# Patient Record
Sex: FEMALE | Race: BLACK OR AFRICAN AMERICAN | NOT HISPANIC OR LATINO | Employment: FULL TIME | URBAN - METROPOLITAN AREA
[De-identification: names, ages, dates, MRNs, and addresses within clinical notes are randomized per-mention and may not be internally consistent; named-entity substitution may affect disease eponyms.]

---

## 2017-07-31 ENCOUNTER — APPOINTMENT (OUTPATIENT)
Dept: URGENT CARE | Facility: CLINIC | Age: 27
End: 2017-07-31
Payer: OTHER MISCELLANEOUS

## 2017-07-31 ENCOUNTER — APPOINTMENT (OUTPATIENT)
Dept: RADIOLOGY | Facility: CLINIC | Age: 27
End: 2017-07-31
Payer: OTHER MISCELLANEOUS

## 2017-07-31 ENCOUNTER — TRANSCRIBE ORDERS (OUTPATIENT)
Dept: URGENT CARE | Facility: CLINIC | Age: 27
End: 2017-07-31

## 2017-07-31 DIAGNOSIS — S69.91XA RIGHT WRIST INJURY, INITIAL ENCOUNTER: ICD-10-CM

## 2017-07-31 DIAGNOSIS — S69.91XA RIGHT WRIST INJURY, INITIAL ENCOUNTER: Primary | ICD-10-CM

## 2017-07-31 PROCEDURE — 73110 X-RAY EXAM OF WRIST: CPT

## 2017-07-31 PROCEDURE — 99284 EMERGENCY DEPT VISIT MOD MDM: CPT

## 2017-07-31 PROCEDURE — G0383 LEV 4 HOSP TYPE B ED VISIT: HCPCS

## 2017-08-02 ENCOUNTER — APPOINTMENT (OUTPATIENT)
Dept: URGENT CARE | Facility: CLINIC | Age: 27
End: 2017-08-02
Payer: OTHER MISCELLANEOUS

## 2017-08-02 PROCEDURE — 99213 OFFICE O/P EST LOW 20 MIN: CPT

## 2017-08-21 ENCOUNTER — APPOINTMENT (OUTPATIENT)
Dept: URGENT CARE | Facility: CLINIC | Age: 27
End: 2017-08-21
Payer: OTHER MISCELLANEOUS

## 2017-08-21 PROCEDURE — 99214 OFFICE O/P EST MOD 30 MIN: CPT

## 2017-09-05 ENCOUNTER — APPOINTMENT (OUTPATIENT)
Dept: URGENT CARE | Facility: CLINIC | Age: 27
End: 2017-09-05
Payer: OTHER MISCELLANEOUS

## 2017-09-05 PROCEDURE — 99213 OFFICE O/P EST LOW 20 MIN: CPT

## 2017-09-12 ENCOUNTER — APPOINTMENT (OUTPATIENT)
Dept: URGENT CARE | Facility: CLINIC | Age: 27
End: 2017-09-12
Payer: OTHER MISCELLANEOUS

## 2017-09-12 PROCEDURE — 99213 OFFICE O/P EST LOW 20 MIN: CPT

## 2017-09-21 ENCOUNTER — APPOINTMENT (OUTPATIENT)
Dept: URGENT CARE | Facility: CLINIC | Age: 27
End: 2017-09-21
Payer: OTHER MISCELLANEOUS

## 2017-09-21 PROCEDURE — 99213 OFFICE O/P EST LOW 20 MIN: CPT

## 2017-09-26 ENCOUNTER — APPOINTMENT (OUTPATIENT)
Dept: URGENT CARE | Facility: CLINIC | Age: 27
End: 2017-09-26
Payer: OTHER MISCELLANEOUS

## 2017-09-26 PROCEDURE — 99214 OFFICE O/P EST MOD 30 MIN: CPT

## 2017-10-05 ENCOUNTER — APPOINTMENT (OUTPATIENT)
Dept: PHYSICAL THERAPY | Facility: CLINIC | Age: 27
End: 2017-10-05
Payer: OTHER MISCELLANEOUS

## 2017-10-09 ENCOUNTER — APPOINTMENT (OUTPATIENT)
Dept: PHYSICAL THERAPY | Facility: CLINIC | Age: 27
End: 2017-10-09
Payer: OTHER MISCELLANEOUS

## 2017-10-09 PROCEDURE — G8991 OTHER PT/OT GOAL STATUS: HCPCS

## 2017-10-09 PROCEDURE — G8990 OTHER PT/OT CURRENT STATUS: HCPCS

## 2017-10-09 PROCEDURE — 97162 PT EVAL MOD COMPLEX 30 MIN: CPT

## 2017-10-11 ENCOUNTER — APPOINTMENT (OUTPATIENT)
Dept: PHYSICAL THERAPY | Facility: CLINIC | Age: 27
End: 2017-10-11
Payer: OTHER MISCELLANEOUS

## 2017-10-11 PROCEDURE — 97110 THERAPEUTIC EXERCISES: CPT

## 2017-10-11 PROCEDURE — 97010 HOT OR COLD PACKS THERAPY: CPT

## 2017-10-11 PROCEDURE — 97140 MANUAL THERAPY 1/> REGIONS: CPT

## 2017-10-17 ENCOUNTER — APPOINTMENT (OUTPATIENT)
Dept: PHYSICAL THERAPY | Facility: CLINIC | Age: 27
End: 2017-10-17
Payer: OTHER MISCELLANEOUS

## 2017-10-17 PROCEDURE — 97110 THERAPEUTIC EXERCISES: CPT

## 2017-10-17 PROCEDURE — 97140 MANUAL THERAPY 1/> REGIONS: CPT

## 2017-10-19 ENCOUNTER — APPOINTMENT (OUTPATIENT)
Dept: PHYSICAL THERAPY | Facility: CLINIC | Age: 27
End: 2017-10-19
Payer: OTHER MISCELLANEOUS

## 2017-10-19 PROCEDURE — 97110 THERAPEUTIC EXERCISES: CPT

## 2017-10-19 PROCEDURE — 97112 NEUROMUSCULAR REEDUCATION: CPT

## 2017-10-19 PROCEDURE — 97140 MANUAL THERAPY 1/> REGIONS: CPT

## 2017-10-24 ENCOUNTER — APPOINTMENT (OUTPATIENT)
Dept: PHYSICAL THERAPY | Facility: CLINIC | Age: 27
End: 2017-10-24
Payer: OTHER MISCELLANEOUS

## 2017-10-24 PROCEDURE — 97110 THERAPEUTIC EXERCISES: CPT

## 2017-10-24 PROCEDURE — 97140 MANUAL THERAPY 1/> REGIONS: CPT

## 2017-10-26 ENCOUNTER — APPOINTMENT (OUTPATIENT)
Dept: PHYSICAL THERAPY | Facility: CLINIC | Age: 27
End: 2017-10-26
Payer: OTHER MISCELLANEOUS

## 2017-10-26 PROCEDURE — 97110 THERAPEUTIC EXERCISES: CPT

## 2017-10-26 PROCEDURE — 97140 MANUAL THERAPY 1/> REGIONS: CPT

## 2017-10-27 ENCOUNTER — APPOINTMENT (OUTPATIENT)
Dept: URGENT CARE | Facility: CLINIC | Age: 27
End: 2017-10-27
Payer: OTHER MISCELLANEOUS

## 2017-10-27 PROCEDURE — 99213 OFFICE O/P EST LOW 20 MIN: CPT

## 2017-10-31 ENCOUNTER — APPOINTMENT (OUTPATIENT)
Dept: PHYSICAL THERAPY | Facility: CLINIC | Age: 27
End: 2017-10-31
Payer: OTHER MISCELLANEOUS

## 2017-10-31 PROCEDURE — 97110 THERAPEUTIC EXERCISES: CPT

## 2017-10-31 PROCEDURE — 97140 MANUAL THERAPY 1/> REGIONS: CPT

## 2017-11-02 ENCOUNTER — APPOINTMENT (OUTPATIENT)
Dept: PHYSICAL THERAPY | Facility: CLINIC | Age: 27
End: 2017-11-02
Payer: OTHER MISCELLANEOUS

## 2017-11-02 PROCEDURE — 97140 MANUAL THERAPY 1/> REGIONS: CPT

## 2017-11-02 PROCEDURE — 97110 THERAPEUTIC EXERCISES: CPT

## 2017-11-07 ENCOUNTER — APPOINTMENT (OUTPATIENT)
Dept: PHYSICAL THERAPY | Facility: CLINIC | Age: 27
End: 2017-11-07
Payer: OTHER MISCELLANEOUS

## 2017-11-07 PROCEDURE — 97140 MANUAL THERAPY 1/> REGIONS: CPT

## 2017-11-09 ENCOUNTER — APPOINTMENT (OUTPATIENT)
Dept: PHYSICAL THERAPY | Facility: CLINIC | Age: 27
End: 2017-11-09
Payer: OTHER MISCELLANEOUS

## 2017-11-09 PROCEDURE — G8990 OTHER PT/OT CURRENT STATUS: HCPCS

## 2017-11-09 PROCEDURE — G8991 OTHER PT/OT GOAL STATUS: HCPCS

## 2017-11-09 PROCEDURE — 97140 MANUAL THERAPY 1/> REGIONS: CPT

## 2017-11-09 PROCEDURE — 99213 OFFICE O/P EST LOW 20 MIN: CPT | Performed by: PHYSICAL THERAPIST

## 2017-12-05 ENCOUNTER — APPOINTMENT (OUTPATIENT)
Dept: URGENT CARE | Facility: CLINIC | Age: 27
End: 2017-12-05
Payer: OTHER MISCELLANEOUS

## 2017-12-05 PROCEDURE — 99213 OFFICE O/P EST LOW 20 MIN: CPT

## 2018-02-19 ENCOUNTER — APPOINTMENT (OUTPATIENT)
Dept: URGENT CARE | Facility: CLINIC | Age: 28
End: 2018-02-19
Payer: OTHER MISCELLANEOUS

## 2018-02-19 DIAGNOSIS — M25.511 ACUTE PAIN OF RIGHT SHOULDER: ICD-10-CM

## 2018-02-19 DIAGNOSIS — M54.9 ACUTE MIDLINE BACK PAIN, UNSPECIFIED BACK LOCATION: Primary | ICD-10-CM

## 2018-02-19 PROCEDURE — 72100 X-RAY EXAM L-S SPINE 2/3 VWS: CPT

## 2018-02-19 PROCEDURE — 73030 X-RAY EXAM OF SHOULDER: CPT

## 2018-02-19 PROCEDURE — G0383 LEV 4 HOSP TYPE B ED VISIT: HCPCS

## 2018-02-19 PROCEDURE — 99284 EMERGENCY DEPT VISIT MOD MDM: CPT

## 2018-02-19 PROCEDURE — 72072 X-RAY EXAM THORAC SPINE 3VWS: CPT

## 2018-02-27 ENCOUNTER — APPOINTMENT (OUTPATIENT)
Dept: URGENT CARE | Facility: CLINIC | Age: 28
End: 2018-02-27
Payer: OTHER MISCELLANEOUS

## 2018-02-27 PROCEDURE — 99214 OFFICE O/P EST MOD 30 MIN: CPT | Performed by: FAMILY MEDICINE

## 2018-03-09 ENCOUNTER — APPOINTMENT (OUTPATIENT)
Dept: URGENT CARE | Facility: CLINIC | Age: 28
End: 2018-03-09
Payer: OTHER MISCELLANEOUS

## 2018-03-09 PROCEDURE — 99214 OFFICE O/P EST MOD 30 MIN: CPT | Performed by: FAMILY MEDICINE

## 2018-03-13 ENCOUNTER — TRANSCRIBE ORDERS (OUTPATIENT)
Dept: PHYSICAL THERAPY | Facility: CLINIC | Age: 28
End: 2018-03-13

## 2018-03-13 ENCOUNTER — EVALUATION (OUTPATIENT)
Dept: PHYSICAL THERAPY | Facility: CLINIC | Age: 28
End: 2018-03-13
Payer: OTHER MISCELLANEOUS

## 2018-03-13 DIAGNOSIS — M54.50 BACK PAIN, LUMBOSACRAL: Primary | ICD-10-CM

## 2018-03-13 DIAGNOSIS — S30.0XXD SACRAL CONTUSION, SUBSEQUENT ENCOUNTER: ICD-10-CM

## 2018-03-13 DIAGNOSIS — S46.911D RIGHT SHOULDER STRAIN, SUBSEQUENT ENCOUNTER: ICD-10-CM

## 2018-03-13 PROCEDURE — G8991 OTHER PT/OT GOAL STATUS: HCPCS

## 2018-03-13 PROCEDURE — G8990 OTHER PT/OT CURRENT STATUS: HCPCS

## 2018-03-13 PROCEDURE — 97140 MANUAL THERAPY 1/> REGIONS: CPT

## 2018-03-13 PROCEDURE — 97162 PT EVAL MOD COMPLEX 30 MIN: CPT

## 2018-03-13 NOTE — PROGRESS NOTES
PT Evaluation     Today's date: 3/13/2018  Patient name: Queenie Lindsey  : 1990  MRN: [id number]  Referring provider: Fer Falcon DO  Dx:   Encounter Diagnosis     ICD-10-CM    1  Back pain, lumbosacral M54 5    2  Sacral contusion, subsequent encounter S30  0XXD    3  Right shoulder strain, subsequent encounter S46 911D                   Assessment  Impairments: abnormal gait, abnormal or restricted ROM, impaired physical strength, pain with function and weight-bearing intolerance  Functional limitations: unable to sit comfortably  Assessment details: Pt is a 33year old RHD female who presents to PT after a recent 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 that resulted in localized lumbosacral pain and R shoulder strain  She presents with localized tenderness to BL PSIS and BL SI joints  She has limited trunk lateral flexion with pain provoked in trunk extension and L Sb  She is unable to sit comfortably so LE strength was assessed in supine or sidelying  She has pain limiting weakness in both LE R > L  Her R shoulder has localized tenderness and trigger points along her R upper and middle trapezius and rhomboid musculature  Her R shoulder strength is exceptional with mild irritation when abd and flex strength and PROM were assessed  She should benefit from skilled PT ot help reduce her pain, increase her mobility, improve her tolerance to sitting, increase strength in BL hip and core musculature, and improve overall functioning  Understanding of Dx/Px/POC: good   Prognosis: good    Goals  STG (3-4 weeks)  1: Decrease pain 2-3 grades on VAS  2: Increase hip ROM 10-15 degrees  3:  Increase LE strength 1/2 grade  LTG (4-6 weeks)  1: Improve FOTO 10 pts  2: pt able to complete overhead activity without pain  3: Pt able to sit for 20-40 min without pain  4: No sleep disturbance      Plan  Patient would benefit from: skilled PT  Planned modality interventions: ultrasound and cryotherapy  Planned therapy interventions: abdominal trunk stabilization, manual therapy, joint mobilization, strengthening, stretching, therapeutic exercise, home exercise program and balance  Frequency: 2x week  Duration in visits: 12  Duration in weeks: 6  Treatment plan discussed with: patient  Plan details: Initiate PT as per POC        Subjective Evaluation    History of Present Illness  Date of onset: 2018  Mechanism of injury: trauma  Mechanism of injury: Tried to bring client from shower to chair, she fell back onto Pt and hit her low back on grab bar,quickly reached back with R arm to get client to reach back for grab bar- think over stretched shoulder  Sleeping is related to activity during the day- more sitting with client then more pain later on  Move around a lot during sleep  Standing is better than sitting  Strained muscle in back- 3 years ago  Not a recurrent problem   Quality of life: good    Pain  Current pain ratin (sitting down)  At best pain ratin  At worst pain ratin  Location: R buttocks, sometimes BL buttocks  Quality: sharp and radiating  Relieving factors: medications and change in position  Aggravating factors: sitting  Progression: improved (depends on the day)    Social Support  Steps to enter house: yes  16  Stairs in house: no   Lives in: apartment  Lives with: sister      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, everyday)  Hand dominance: right      Diagnostic Tests  X-ray: normal  Patient Goals  Patient goals for therapy: decreased pain, independence with ADLs/IADLs and return to sport/leisure activities          Objective     Active Range of Motion     Right Shoulder   Normal active range of motion    Lumbar   Flexion: 80 degrees   Extension: 35 degrees   Left lateral flexion: 30 degrees   Right lateral flexion: 40 degrees     Additional Active Range of Motion Details  Tenderness to R UT/MT/rhomboid       Tenderness tolocalzed tendnerness to sacrum and PSIS BL  Pain at end range ext and L SB        Passive Range of Motion     Right Shoulder   Normal passive range of motion  Flexion: 155 degrees   Abduction: 145 degrees   External rotation 90°: 75 degrees   Internal rotation 90°: 65 degrees   Left Hip   Flexion: 105 degrees   External rotation (90/90): 45 degrees   Internal rotation (90/90): 15 degrees     Right Hip   Flexion: 95 degrees   External rotation (90/90): 45 degrees   Internal rotation (90/90): 25 degrees     Additional Passive Range of Motion Details  Pain at end range flexion and abd  No pain with GHJ rotation  No significant capsular tightness observed  -ANYA's  +FADIR BL    Strength/Myotome Testing     Right Shoulder     Planes of Motion   Flexion: 4+   Extension: 4+   Abduction: 4+   External rotation at 0°: 4   Internal rotation at 0°: 4+     Left Hip   Planes of Motion   Flexion: 4+  Extension: 4  Abduction: 4-  Adduction: 4  External rotation: 4-  Internal rotation: 4-    Right Hip   Planes of Motion   Flexion: 4  Extension: 4  Abduction: 4-  Adduction: 4-  External rotation: 4-  Internal rotation: 4-    Additional Strength Details  Mild pain with abd and ER resisted  Strength assessed in supine hookyling position  Hip abd/add  assessed in SL in pain free ranges  Pain with hip abd BL; pain with all motions on R  -SLR, mild pain with hamstring stretch          Flowsheet Rows    Flowsheet Row Most Recent Value   PT/OT G-Codes   Current Score  55   Projected Score  47   FOTO information reviewed  Yes   Assessment Type  Evaluation   G code set  Other PT/OT Primary   Other PT Primary Current Status ()  CK   Other PT Primary Goal Status ()  CJ          Precautions: pain with sitting    Daily Treatment Diary     Manual              Hip ROM             Gentle lateral distraction with strap BL             SL SI joint mobs             isometrics                              Exercise Diary              Knee to chest stretch             Knee fallout             Cane overhead with DLS Hip flex isometrics BL             Hip ext isometrics with strap BL                                                                                                                                                                                                                    Modalities              US in prone to SI joint                                         HEP: hip flex in hooklying for stretch, posterior pelvic tilts, knee fall out, pelvic tilts ant/post in standing, wall slides for R shoulder, TB row, ext

## 2018-03-19 ENCOUNTER — OFFICE VISIT (OUTPATIENT)
Dept: PHYSICAL THERAPY | Facility: CLINIC | Age: 28
End: 2018-03-19
Payer: OTHER MISCELLANEOUS

## 2018-03-19 DIAGNOSIS — S46.911D RIGHT SHOULDER STRAIN, SUBSEQUENT ENCOUNTER: ICD-10-CM

## 2018-03-19 DIAGNOSIS — S30.0XXD SACRAL CONTUSION, SUBSEQUENT ENCOUNTER: Primary | ICD-10-CM

## 2018-03-19 DIAGNOSIS — M54.50 BACK PAIN, LUMBOSACRAL: ICD-10-CM

## 2018-03-19 PROCEDURE — 97140 MANUAL THERAPY 1/> REGIONS: CPT

## 2018-03-19 NOTE — PROGRESS NOTES
Daily Note     Today's date: 3/19/2018  Patient name: Kaila Myers  : 1990  MRN: [id number]  Referring provider: All Farnsworth DO  Dx:   Encounter Diagnosis     ICD-10-CM    1  Sacral contusion, subsequent encounter S30  0XXD    2  Back pain, lumbosacral M54 5    3  Right shoulder strain, subsequent encounter S46 911D                   Subjective: Exercises are really helpful  Worked a double shift on Friday going into Saturday, was really sore after  Today feeling lightheaded from allergies and anemia and one of the clients had [place] of [place] onion soup and she's allergic to onions so it set her off  She wishes to make session a bit shorter today if at all possible  R shoulder hardly bothering her at all  Objective: See treatment diary below  Daily Treatment Diary     Manual  3/19            Hip ROM 10            Gentle lateral distraction with strap BL 10            SL SI joint mobs 10            isometrics              30                Exercise Diary  3/19            Knee to chest stretch NV            Knee fallout NV            Cane overhead with DLS NV            Hip flex isometrics BL NV            Hip ext isometrics with strap BL NV                                                                                                                                                                                                                   Modalities              US in prone to SI joint NP                                        HEP: hip flex in hooklying for stretch, posterior pelvic tilts, knee fall out, pelvic tilts ant/post in standing, wall slides for R shoulder, TB row, ext      Assessment: Tolerated treatment well  Patient would benefit from continued PT  Pt tolerated first session well  Performed only manuals on Pt  Hip IR still limited  Pt reported her low back was feeling good after session  Add TE Nv        Plan: Continue per plan of care

## 2018-03-21 ENCOUNTER — APPOINTMENT (OUTPATIENT)
Dept: PHYSICAL THERAPY | Facility: CLINIC | Age: 28
End: 2018-03-21
Payer: OTHER MISCELLANEOUS

## 2018-03-26 ENCOUNTER — OFFICE VISIT (OUTPATIENT)
Dept: PHYSICAL THERAPY | Facility: CLINIC | Age: 28
End: 2018-03-26
Payer: OTHER MISCELLANEOUS

## 2018-03-26 DIAGNOSIS — S30.0XXD SACRAL CONTUSION, SUBSEQUENT ENCOUNTER: Primary | ICD-10-CM

## 2018-03-26 DIAGNOSIS — S46.911D RIGHT SHOULDER STRAIN, SUBSEQUENT ENCOUNTER: ICD-10-CM

## 2018-03-26 DIAGNOSIS — M54.50 BACK PAIN, LUMBOSACRAL: ICD-10-CM

## 2018-03-26 PROCEDURE — 97110 THERAPEUTIC EXERCISES: CPT

## 2018-03-26 PROCEDURE — 97140 MANUAL THERAPY 1/> REGIONS: CPT

## 2018-03-26 NOTE — PROGRESS NOTES
Daily Note     Today's date: 3/26/2018  Patient name: Sis Vines  : 1990  MRN: [id number]  Referring provider: Jule Lesches, DO  Dx:   Encounter Diagnosis     ICD-10-CM    1  Sacral contusion, subsequent encounter S30  0XXD    2  Back pain, lumbosacral M54 5    3  Right shoulder strain, subsequent encounter S46 911D                   Subjective: Pt reports her pulled tooth bothers her so much she has forgotten about her pain in the shld in [place]  Objective: See treatment diary below      Assessment: Tolerated treatment well  Patient  Could maxwell a progression NV   Plan: Continue per plan of care  Progress treatment as tolerated      Daily Treatment Diary     Manual  3/19 3/26           Hip ROM 10 JK 10           Gentle lateral distraction with strap BL 10 distal  JK  10           SL SI joint mobs 10 np           isometrics             MFR to QL in SL 30 JK 10               Exercise Diary  3/19 3/26           Knee to chest stretch NV 3x20"           Knee fallout NV 10           Cane overhead with DLS NV 08G4"           Hip flex isometrics BL NV 10x10"           Hip ext isometrics with strap BL NV 10x10"                                                                                                                                                                                                                  Modalities   3/26           US in prone to SI joint NP np

## 2018-03-27 ENCOUNTER — APPOINTMENT (OUTPATIENT)
Dept: URGENT CARE | Facility: CLINIC | Age: 28
End: 2018-03-27
Payer: OTHER MISCELLANEOUS

## 2018-03-27 PROCEDURE — 99213 OFFICE O/P EST LOW 20 MIN: CPT | Performed by: FAMILY MEDICINE

## 2018-03-28 ENCOUNTER — OFFICE VISIT (OUTPATIENT)
Dept: PHYSICAL THERAPY | Facility: CLINIC | Age: 28
End: 2018-03-28
Payer: OTHER MISCELLANEOUS

## 2018-03-28 DIAGNOSIS — S46.911D RIGHT SHOULDER STRAIN, SUBSEQUENT ENCOUNTER: ICD-10-CM

## 2018-03-28 DIAGNOSIS — M54.50 BACK PAIN, LUMBOSACRAL: ICD-10-CM

## 2018-03-28 DIAGNOSIS — S30.0XXD SACRAL CONTUSION, SUBSEQUENT ENCOUNTER: Primary | ICD-10-CM

## 2018-03-28 PROCEDURE — G8992 OTHER PT/OT  D/C STATUS: HCPCS

## 2018-03-28 PROCEDURE — 97140 MANUAL THERAPY 1/> REGIONS: CPT

## 2018-03-28 PROCEDURE — 97110 THERAPEUTIC EXERCISES: CPT

## 2018-03-28 PROCEDURE — G8991 OTHER PT/OT GOAL STATUS: HCPCS

## 2018-03-28 NOTE — PROGRESS NOTES
Daily Note     Today's date: 3/28/2018  Patient name: Chance Mena  : 1990  MRN: [id number]  Referring provider: Josefa Cordova DO  Dx:   Encounter Diagnosis     ICD-10-CM    1  Sacral contusion, subsequent encounter S30  0XXD    2  Back pain, lumbosacral M54 5    3  Right shoulder strain, subsequent encounter H78 277L               18: Pt has not returned to therapy since 3/28/18, no follow up, D/C to home program at this time  Subjective: Pt reports she saw the Dr  He discharged her to return to full duty next week  She reports she is feeling okay, she still has mild pain to area in sacrum, she can tolerate longer sitting periods and if she has to sit for a long period of time at work she brings her "emergency" kit that includes her sitting donut  Pt also reports exercises are really helping  She began jogging for a marathon she will be running in Oct, she jogged about 1 5 miles without pain  Objective: See treatment diary below  Daily Treatment Diary     Manual  3/19 3/26 3/28          Hip ROM 10 JK 10 10          Gentle lateral distraction with strap BL 10 distal  JK  10 10          SL SI joint mobs 10 np 5          isometrics             MFR to QL in SL 30 JK 10 25              Exercise Diary  3/19 3/26 3/28          Knee to chest stretch NV 3x20" 3x20"          Knee fallout NV 10 10          Cane overhead with DLS NV 94M0"           Hip flex isometrics BL NV 10x10" 10x10"          Hip ext isometrics with strap BL NV 10x10" 10x10"          Supine clamshell   BTB 15x5"                                                                                                                                                                                      10              Modalities   3/26           US in prone to SI joint NP np                                               Assessment: Tolerated treatment well   Patient exhibited good technique with therapeutic exercises and would benefit from continued PT  Pt tolerated session well, increased hip mobility  Pt should continue with therapy to further her core and hip strength to help stabilize her SI joint  Plan: Continue per plan of care

## 2019-04-01 ENCOUNTER — APPOINTMENT (OUTPATIENT)
Dept: RADIOLOGY | Facility: CLINIC | Age: 29
End: 2019-04-01
Payer: OTHER MISCELLANEOUS

## 2019-04-01 ENCOUNTER — APPOINTMENT (OUTPATIENT)
Dept: URGENT CARE | Facility: CLINIC | Age: 29
End: 2019-04-01
Payer: OTHER MISCELLANEOUS

## 2019-04-01 DIAGNOSIS — M25.511 ACUTE PAIN OF RIGHT SHOULDER: Primary | ICD-10-CM

## 2019-04-01 DIAGNOSIS — M25.511 ACUTE PAIN OF RIGHT SHOULDER: ICD-10-CM

## 2019-04-01 PROCEDURE — G0382 LEV 3 HOSP TYPE B ED VISIT: HCPCS | Performed by: NURSE PRACTITIONER

## 2019-04-01 PROCEDURE — 99283 EMERGENCY DEPT VISIT LOW MDM: CPT | Performed by: NURSE PRACTITIONER

## 2019-04-01 PROCEDURE — 73030 X-RAY EXAM OF SHOULDER: CPT

## 2019-04-05 ENCOUNTER — APPOINTMENT (OUTPATIENT)
Dept: URGENT CARE | Facility: CLINIC | Age: 29
End: 2019-04-05
Payer: OTHER MISCELLANEOUS

## 2019-04-05 PROCEDURE — 99213 OFFICE O/P EST LOW 20 MIN: CPT | Performed by: PHYSICIAN ASSISTANT

## 2019-04-16 ENCOUNTER — APPOINTMENT (OUTPATIENT)
Dept: URGENT CARE | Facility: CLINIC | Age: 29
End: 2019-04-16
Payer: OTHER MISCELLANEOUS

## 2019-04-16 PROCEDURE — 99213 OFFICE O/P EST LOW 20 MIN: CPT | Performed by: PHYSICIAN ASSISTANT

## 2023-12-26 ENCOUNTER — HOSPITAL ENCOUNTER (EMERGENCY)
Facility: HOSPITAL | Age: 33
Discharge: HOME | End: 2023-12-26
Attending: EMERGENCY MEDICINE
Payer: COMMERCIAL

## 2023-12-26 ENCOUNTER — APPOINTMENT (EMERGENCY)
Dept: RADIOLOGY | Facility: HOSPITAL | Age: 33
End: 2023-12-26
Payer: COMMERCIAL

## 2023-12-26 VITALS
TEMPERATURE: 98.4 F | RESPIRATION RATE: 18 BRPM | SYSTOLIC BLOOD PRESSURE: 136 MMHG | DIASTOLIC BLOOD PRESSURE: 72 MMHG | HEART RATE: 98 BPM | OXYGEN SATURATION: 99 %
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
## 2023-12-26 DIAGNOSIS — M25.512 LEFT SHOULDER PAIN: ICD-10-CM

## 2023-12-26 PROCEDURE — 96372 THER/PROPH/DIAG INJ SC/IM: CPT

## 2023-12-26 PROCEDURE — 99284 EMERGENCY DEPT VISIT MOD MDM: CPT

## 2023-12-26 PROCEDURE — 73030 X-RAY EXAM OF SHOULDER: CPT

## 2023-12-26 PROCEDURE — 99284 EMERGENCY DEPT VISIT MOD MDM: CPT | Performed by: EMERGENCY MEDICINE

## 2023-12-26 RX ORDER — KETOROLAC TROMETHAMINE 30 MG/ML
15 INJECTION, SOLUTION INTRAMUSCULAR; INTRAVENOUS ONCE
Status: COMPLETED | OUTPATIENT
Start: 2023-12-26 | End: 2023-12-26

## 2023-12-26 RX ADMIN — KETOROLAC TROMETHAMINE 15 MG: 30 INJECTION, SOLUTION INTRAMUSCULAR; INTRAVENOUS at 17:12
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
## 2023-12-30 LAB
DME PARACHUTE DELIVERY DATE ACTUAL: NORMAL
DME PARACHUTE DELIVERY DATE REQUESTED: NORMAL
DME PARACHUTE ITEM DESCRIPTION: NORMAL
DME PARACHUTE ORDER STATUS: NORMAL
DME PARACHUTE SUPPLIER NAME: NORMAL
DME PARACHUTE SUPPLIER PHONE: NORMAL
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
## 2024-05-08 ENCOUNTER — HOSPITAL ENCOUNTER (EMERGENCY)
Facility: HOSPITAL | Age: 34
Discharge: HOME | End: 2024-05-08
Attending: EMERGENCY MEDICINE
Payer: COMMERCIAL

## 2024-05-08 ENCOUNTER — APPOINTMENT (EMERGENCY)
Dept: RADIOLOGY | Facility: HOSPITAL | Age: 34
End: 2024-05-08
Payer: COMMERCIAL

## 2024-05-08 VITALS
HEART RATE: 96 BPM | RESPIRATION RATE: 19 BRPM | TEMPERATURE: 97 F | SYSTOLIC BLOOD PRESSURE: 124 MMHG | DIASTOLIC BLOOD PRESSURE: 84 MMHG | OXYGEN SATURATION: 98 %

## 2024-05-08 DIAGNOSIS — R10.2 PELVIC PAIN: Primary | ICD-10-CM

## 2024-05-08 DIAGNOSIS — N83.209 RUPTURED OVARIAN CYST: ICD-10-CM

## 2024-05-08 LAB
ALBUMIN SERPL BCP-MCNC: 4.2 G/DL (ref 3.5–5)
ALP SERPL-CCNC: 56 U/L (ref 34–104)
ALT SERPL W P-5'-P-CCNC: 10 U/L (ref 7–52)
ANION GAP SERPL CALCULATED.3IONS-SCNC: 8 MMOL/L (ref 4–13)
AST SERPL W P-5'-P-CCNC: 10 U/L (ref 13–39)
BACTERIA UR QL AUTO: ABNORMAL /HPF
BASOPHILS # BLD AUTO: 0.03 THOUSANDS/ÂΜL (ref 0–0.1)
BASOPHILS NFR BLD AUTO: 0 % (ref 0–1)
BILIRUB SERPL-MCNC: 0.4 MG/DL (ref 0.2–1)
BILIRUB UR QL STRIP: NEGATIVE
BUN SERPL-MCNC: 13 MG/DL (ref 5–25)
CALCIUM SERPL-MCNC: 8.8 MG/DL (ref 8.4–10.2)
CAOX CRY URNS QL MICRO: ABNORMAL /HPF
CHLORIDE SERPL-SCNC: 108 MMOL/L (ref 96–108)
CLARITY UR: ABNORMAL
CO2 SERPL-SCNC: 21 MMOL/L (ref 21–32)
COLOR UR: ABNORMAL
CREAT SERPL-MCNC: 0.73 MG/DL (ref 0.6–1.3)
EOSINOPHIL # BLD AUTO: 0 THOUSAND/ÂΜL (ref 0–0.61)
EOSINOPHIL NFR BLD AUTO: 0 % (ref 0–6)
ERYTHROCYTE [DISTWIDTH] IN BLOOD BY AUTOMATED COUNT: 16.6 % (ref 11.6–15.1)
EXT PREGNANCY TEST URINE: NEGATIVE
EXT. CONTROL: NORMAL
GFR SERPL CREATININE-BSD FRML MDRD: 109 ML/MIN/1.73SQ M
GLUCOSE SERPL-MCNC: 105 MG/DL (ref 65–140)
GLUCOSE UR STRIP-MCNC: NEGATIVE MG/DL
HCT VFR BLD AUTO: 32.7 % (ref 34.8–46.1)
HGB BLD-MCNC: 10.2 G/DL (ref 11.5–15.4)
HGB UR QL STRIP.AUTO: ABNORMAL
IMM GRANULOCYTES # BLD AUTO: 0.04 THOUSAND/UL (ref 0–0.2)
IMM GRANULOCYTES NFR BLD AUTO: 0 % (ref 0–2)
KETONES UR STRIP-MCNC: ABNORMAL MG/DL
LEUKOCYTE ESTERASE UR QL STRIP: NEGATIVE
LIPASE SERPL-CCNC: 35 U/L (ref 11–82)
LYMPHOCYTES # BLD AUTO: 0.87 THOUSANDS/ÂΜL (ref 0.6–4.47)
LYMPHOCYTES NFR BLD AUTO: 9 % (ref 14–44)
MCH RBC QN AUTO: 25.1 PG (ref 26.8–34.3)
MCHC RBC AUTO-ENTMCNC: 31.2 G/DL (ref 31.4–37.4)
MCV RBC AUTO: 81 FL (ref 82–98)
MONOCYTES # BLD AUTO: 0.69 THOUSAND/ÂΜL (ref 0.17–1.22)
MONOCYTES NFR BLD AUTO: 7 % (ref 4–12)
MUCOUS THREADS UR QL AUTO: ABNORMAL
NEUTROPHILS # BLD AUTO: 8.28 THOUSANDS/ÂΜL (ref 1.85–7.62)
NEUTS SEG NFR BLD AUTO: 84 % (ref 43–75)
NITRITE UR QL STRIP: NEGATIVE
NON-SQ EPI CELLS URNS QL MICRO: ABNORMAL /HPF
NRBC BLD AUTO-RTO: 0 /100 WBCS
PH UR STRIP.AUTO: 6 [PH] (ref 4.5–8)
PLATELET # BLD AUTO: 315 THOUSANDS/UL (ref 149–390)
PMV BLD AUTO: 10.3 FL (ref 8.9–12.7)
POTASSIUM SERPL-SCNC: 3.8 MMOL/L (ref 3.5–5.3)
PROT SERPL-MCNC: 7.9 G/DL (ref 6.4–8.4)
PROT UR STRIP-MCNC: ABNORMAL MG/DL
RBC # BLD AUTO: 4.06 MILLION/UL (ref 3.81–5.12)
RBC #/AREA URNS AUTO: ABNORMAL /HPF
SODIUM SERPL-SCNC: 137 MMOL/L (ref 135–147)
SP GR UR STRIP.AUTO: >=1.03 (ref 1–1.03)
UROBILINOGEN UR QL STRIP.AUTO: 0.2 E.U./DL
WBC # BLD AUTO: 9.91 THOUSAND/UL (ref 4.31–10.16)
WBC #/AREA URNS AUTO: ABNORMAL /HPF

## 2024-05-08 PROCEDURE — 36415 COLL VENOUS BLD VENIPUNCTURE: CPT

## 2024-05-08 PROCEDURE — 85025 COMPLETE CBC W/AUTO DIFF WBC: CPT

## 2024-05-08 PROCEDURE — 81025 URINE PREGNANCY TEST: CPT

## 2024-05-08 PROCEDURE — 96374 THER/PROPH/DIAG INJ IV PUSH: CPT

## 2024-05-08 PROCEDURE — 74177 CT ABD & PELVIS W/CONTRAST: CPT

## 2024-05-08 PROCEDURE — 96375 TX/PRO/DX INJ NEW DRUG ADDON: CPT

## 2024-05-08 PROCEDURE — 99284 EMERGENCY DEPT VISIT MOD MDM: CPT

## 2024-05-08 PROCEDURE — 83690 ASSAY OF LIPASE: CPT

## 2024-05-08 PROCEDURE — 99285 EMERGENCY DEPT VISIT HI MDM: CPT | Performed by: EMERGENCY MEDICINE

## 2024-05-08 PROCEDURE — 81001 URINALYSIS AUTO W/SCOPE: CPT

## 2024-05-08 PROCEDURE — 80053 COMPREHEN METABOLIC PANEL: CPT

## 2024-05-08 RX ORDER — KETOROLAC TROMETHAMINE 30 MG/ML
15 INJECTION, SOLUTION INTRAMUSCULAR; INTRAVENOUS ONCE
Status: COMPLETED | OUTPATIENT
Start: 2024-05-08 | End: 2024-05-08

## 2024-05-08 RX ORDER — OXYCODONE HYDROCHLORIDE AND ACETAMINOPHEN 5; 325 MG/1; MG/1
1 TABLET ORAL EVERY 6 HOURS PRN
Qty: 12 TABLET | Refills: 0 | Status: SHIPPED | OUTPATIENT
Start: 2024-05-08

## 2024-05-08 RX ORDER — ONDANSETRON 2 MG/ML
4 INJECTION INTRAMUSCULAR; INTRAVENOUS ONCE
Status: COMPLETED | OUTPATIENT
Start: 2024-05-08 | End: 2024-05-08

## 2024-05-08 RX ADMIN — KETOROLAC TROMETHAMINE 15 MG: 30 INJECTION, SOLUTION INTRAMUSCULAR; INTRAVENOUS at 14:16

## 2024-05-08 RX ADMIN — ONDANSETRON 4 MG: 2 INJECTION INTRAMUSCULAR; INTRAVENOUS at 14:17

## 2024-05-08 RX ADMIN — IOHEXOL 100 ML: 350 INJECTION, SOLUTION INTRAVENOUS at 14:59

## 2024-05-08 RX ADMIN — MORPHINE SULFATE 2 MG: 2 INJECTION, SOLUTION INTRAMUSCULAR; INTRAVENOUS at 15:43

## 2024-05-08 NOTE — DISCHARGE INSTRUCTIONS
Return to ER for any severe pain not controlled with at home medications or for new fever, shortness of breath, or persistent vomiting.  Take Tylenol 500 mg and ibuprofen 400 mg 4 times per day for the next several days.

## 2024-05-08 NOTE — ED PROVIDER NOTES
History  Chief Complaint   Patient presents with    Pelvic Pain     Reports R sided pelvic pain. Has known kidney stones and has L sided uterine cyst. +N/V.  Denies pain with urination.      33-year-old female with history of uterine fibroids presents with acute onset right lower quadrant pain that started approximately 4 hours prior to arrival.  Pain is described as constant, severe, and radiates to the left lower quadrant.  Associated with nausea with 1 episode of nonbloody vomiting.  Denies chest pain, respiratory symptoms, urinary symptoms, vaginal discharge, or any other symptoms.  Currently on her menstrual cycle.    Per chart review patient has a history of recurrent pelvic pain with recent ultrasound imaging showing normal right ovary.        Prior to Admission Medications   Prescriptions Last Dose Informant Patient Reported? Taking?   Loratadine (CLARITIN) 10 MG CAPS Not Taking  Yes No   Sig: Take 1 tablet by mouth daily.   Patient not taking: Reported on 5/8/2024   methocarbamol (ROBAXIN) 500 mg tablet Not Taking  No No   Sig: Take 1 tablet (500 mg total) by mouth 4 (four) times a day as needed for muscle spasms.   Patient not taking: Reported on 5/8/2024      Facility-Administered Medications: None       Past Medical History:   Diagnosis Date    Allergic        History reviewed. No pertinent surgical history.    History reviewed. No pertinent family history.  I have reviewed and agree with the history as documented.    E-Cigarette/Vaping     E-Cigarette/Vaping Substances     Social History     Tobacco Use    Smoking status: Never    Smokeless tobacco: Never   Substance Use Topics    Alcohol use: No    Drug use: No        Review of Systems   Constitutional:  Negative for chills.   HENT:  Negative for ear pain and sore throat.    Eyes:  Negative for pain and visual disturbance.   Respiratory:  Negative for cough and shortness of breath.    Cardiovascular:  Negative for chest pain and palpitations.    Gastrointestinal:  Positive for abdominal pain, nausea and vomiting.   Genitourinary:  Negative for dysuria and hematuria.   Musculoskeletal:  Negative for arthralgias and back pain.   Skin:  Negative for color change and rash.   Neurological:  Negative for seizures and syncope.   All other systems reviewed and are negative.      Physical Exam  ED Triage Vitals   Temperature Pulse Respirations Blood Pressure SpO2   05/08/24 1218 05/08/24 1218 05/08/24 1218 05/08/24 1218 05/08/24 1218   (!) 97 °F (36.1 °C) 70 20 115/72 97 %      Temp src Heart Rate Source Patient Position - Orthostatic VS BP Location FiO2 (%)   -- 05/08/24 1540 05/08/24 1218 05/08/24 1218 --    Monitor Lying Right arm       Pain Score       05/08/24 1218       10 - Worst Possible Pain             Orthostatic Vital Signs  Vitals:    05/08/24 1218 05/08/24 1540   BP: 115/72 124/84   Pulse: 70 96   Patient Position - Orthostatic VS: Lying Standing       Physical Exam  Vitals and nursing note reviewed.   Constitutional:       General: She is in acute distress.      Appearance: Normal appearance. She is normal weight. She is not ill-appearing, toxic-appearing or diaphoretic.      Comments: Uncomfortable appearing   HENT:      Head: Normocephalic and atraumatic.      Right Ear: External ear normal.      Left Ear: External ear normal.      Nose: Nose normal.      Mouth/Throat:      Mouth: Mucous membranes are moist.      Pharynx: Oropharynx is clear.   Eyes:      General:         Right eye: No discharge.         Left eye: No discharge.      Conjunctiva/sclera: Conjunctivae normal.   Cardiovascular:      Rate and Rhythm: Normal rate.      Pulses: Normal pulses.      Heart sounds: Normal heart sounds. No murmur heard.     No friction rub.   Pulmonary:      Effort: Pulmonary effort is normal. No respiratory distress.      Breath sounds: Normal breath sounds. No wheezing or rales.   Abdominal:      General: Abdomen is flat. Bowel sounds are normal. There is  no distension.      Palpations: Abdomen is soft.      Tenderness: There is abdominal tenderness. There is no right CVA tenderness, left CVA tenderness, guarding or rebound.      Hernia: No hernia is present.      Comments: Right lower quadrant tenderness.   Musculoskeletal:         General: Normal range of motion.      Cervical back: Normal range of motion and neck supple.   Skin:     General: Skin is warm and dry.      Capillary Refill: Capillary refill takes less than 2 seconds.      Coloration: Skin is not pale.   Neurological:      Mental Status: She is alert and oriented to person, place, and time.   Psychiatric:         Mood and Affect: Mood normal.         Behavior: Behavior normal.         ED Medications  Medications   ondansetron (ZOFRAN) injection 4 mg (4 mg Intravenous Given 5/8/24 1417)   ketorolac (TORADOL) injection 15 mg (15 mg Intravenous Given 5/8/24 1416)   iohexol (OMNIPAQUE) 350 MG/ML injection (SINGLE-DOSE) 100 mL (100 mL Intravenous Given 5/8/24 1459)   morphine injection 2 mg (2 mg Intravenous Given 5/8/24 1543)       Diagnostic Studies  Results Reviewed       Procedure Component Value Units Date/Time    Lipase [[id number]]  (Normal) Collected: 05/08/24 1414    Lab Status: Final result Specimen: Blood from Arm, Left Updated: 05/08/24 1445     Lipase 35 u/L     Comprehensive metabolic panel [[id number]]  (Abnormal) Collected: 05/08/24 1414    Lab Status: Final result Specimen: Blood from Arm, Left Updated: 05/08/24 1445     Sodium 137 mmol/L      Potassium 3.8 mmol/L      Chloride 108 mmol/L      CO2 21 mmol/L      ANION GAP 8 mmol/L      BUN 13 mg/dL      Creatinine 0.73 mg/dL      Glucose 105 mg/dL      Calcium 8.8 mg/dL      AST 10 U/L      ALT 10 U/L      Alkaline Phosphatase 56 U/L      Total Protein 7.9 g/dL      Albumin 4.2 g/dL      Total Bilirubin 0.40 mg/dL      eGFR 109 ml/min/1.73sq m     Narrative:      National Kid[employer] Foundation guidelines for Chronic Kidney Disease (CKD):      Stage 1 with normal or high GFR (GFR > 90 mL/min/1.73 square meters)    Stage 2 Mild CKD (GFR = 60-89 mL/min/1.73 square meters)    Stage 3A Moderate CKD (GFR = 45-59 mL/min/1.73 square meters)    Stage 3B Moderate CKD (GFR = 30-44 mL/min/1.73 square meters)    Stage 4 Severe CKD (GFR = 15-29 mL/min/1.73 square meters)    Stage 5 End Stage CKD (GFR <15 mL/min/1.73 square meters)  Note: GFR calculation is accurate only with a steady state creatinine    CBC and differential [[id number]]  (Abnormal) Collected: 05/08/24 1414    Lab Status: Final result Specimen: Blood from Arm, Left Updated: 05/08/24 1424     WBC 9.91 Thousand/uL      RBC 4.06 Million/uL      Hemoglobin 10.2 g/dL      Hematocrit 32.7 %      MCV 81 fL      MCH 25.1 pg      MCHC 31.2 g/dL      RDW 16.6 %      MPV 10.3 fL      Platelets 315 Thousands/uL      nRBC 0 /100 WBCs      Segmented % 84 %      Immature Grans % 0 %      Lymphocytes % 9 %      Monocytes % 7 %      Eosinophils Relative 0 %      Basophils Relative 0 %      Absolute Neutrophils 8.28 Thousands/µL      Absolute Immature Grans 0.04 Thousand/uL      Absolute Lymphocytes 0.87 Thousands/µL      Absolute Monocytes 0.69 Thousand/µL      Eosinophils Absolute 0.00 Thousand/µL      Basophils Absolute 0.03 Thousands/µL     Urine Microscopic [[id number]]  (Abnormal) Collected: 05/08/24 1354    Lab Status: Final result Specimen: Urine, Clean Catch Updated: 05/08/24 1418     RBC, UA Innumerable /hpf      WBC, UA 4-10 /hpf      Epithelial Cells Occasional /hpf      Bacteria, UA None Seen /hpf      MUCUS THREADS Moderate     Ca Oxalate Bonnie, UA Occasional /hpf     POCT pregnancy, urine [[id number]]  (Normal) Resulted: 05/08/24 1357    Lab Status: Final result Updated: 05/08/24 1357     EXT Preg Test, Ur Negative     Control Valid    Urine Macroscopic, POC [[id number]]  (Abnormal) Collected: 05/08/24 1354    Lab Status: Final result Specimen: Urine Updated: 05/08/24 1356     Color, UA Kelly     Clarity,  UA Slightly Cloudy     pH, UA 6.0     Leukocytes, UA Negative     Nitrite, UA Negative     Protein, UA 30 (1+) mg/dl      Glucose, UA Negative mg/dl      Ketones, UA 15 (1+) mg/dl      Urobilinogen, UA 0.2 E.U./dl      Bilirubin, UA Negative     Occult Blood, UA Large     Specific Gravity, UA >=1.030    Narrative:      CL[facility] RESULT                   CT abdomen pelvis w contrast   Final Result by Mayte Gannon MD (05/08 1608)      1) Small amount of hemoperitoneum in the pelvis, some dependently, and some anteriorly. This may be related to a ruptured ovarian cyst as there is suggestion of a collapsed corpus luteal cyst in the right ovary.      2) Uterus containing several fibroids, with 2 dominant posterior intramural fibroids demonstrating decreased attenuation, suggestive of cystic degeneration.      3) No other acute abdominal or pelvic pathology.      4) Normal appendix. No bowel obstruction.  Evaluation for bowel inflammation elsewhere somewhat limited by underdistention and lack of oral contrast, however no convincing inflammatory changes.  Please note mild inflammation may not be apparent.      5) Collapsed urinary bladder limiting evaluation.      6) Additional findings as above.      The study was marked in EPIC for immediate notification.            Workstation performed: [id number]               Procedures  Procedures      ED Course  ED Course as of 05/12/24 2211   Wed May 08, 2024   1536 On reexamination patient reports significant improvement in pain.  Abdomen soft with right lower quadrant tenderness.                                       Medical Decision Making  33-year-old female with right lower quadrant pain.  Recent transvaginal ultrasound shows normal-appearing right ovary.  Ovarian torsion or ruptured ovarian cyst less likely.  Labs and imaging ordered to assess for right ureterolithiasis, acute appendicitis.  Patient has a history of uterine fibroids.  Differential diagnosis also includes  ischemic fibroid.    Workup significant for CT evidence of recently ruptured cyst.  Patient reports improvement in symptoms on repeat examination.  Feels well enough to go home.    Amount and/or Complexity of Data Reviewed  Labs: ordered.  Radiology: ordered.    Risk  Prescription drug management.          Disposition  Final diagnoses:   Pelvic pain   Ruptured ovarian cyst     Time reflects when diagnosis was documented in both MDM as applicable and the Disposition within this note       Time User Action Codes Description Comment    5/8/2024  4:23 PM Wyatt Yu [R10.2] Pelvic pain     5/8/2024  4:23 PM Wyatt Yu [N83.209] Ruptured ovarian cyst           ED Disposition       ED Disposition   Discharge    Condition   Stable    Date/Time   Wed May 8, 2024  4:23 PM    Comment   Sharri BACON Shaggy discharge to home/self care.                   Follow-up Information       Follow up With Specialties Details Why Contact Info    Cecilia Martinez DO Family Medicine   [address] [place] [address]  [phone]              Discharge Medication List as of 5/8/2024  4:24 PM        CONTINUE these medications which have NOT CHANGED    Details   Loratadine (CLARITIN) 10 MG CAPS Take 1 tablet by mouth daily., Until Discontinued, Historical Med      methocarbamol (ROBAXIN) 500 mg tablet Take 1 tablet (500 mg total) by mouth 4 (four) times a day as needed for muscle spasms., Starting 6/6/2016, Until Discontinued, Print           No discharge procedures on file.    PDMP Review       None             ED Provider  Attending physically available and evaluated Sharri BACON Shaggy. I managed the patient along with the ED Attending.    Electronically Signed by           Wyatt Yu MD  05/12/24 2553

## 2024-05-08 NOTE — ED ATTENDING ATTESTATION
5/8/2024  I, Luiz Beaver MD, saw and evaluated the patient. I have discussed the patient with the resident/non-physician practitioner and agree with the resident's/non-physician practitioner's findings, Plan of Care, and MDM as documented in the resident's/non-physician practitioner's note, except where noted. All available labs and Radiology studies were reviewed.  I was present for key portions of any procedure(s) performed by the resident/non-physician practitioner and I was immediately available to provide assistance.       At this point I agree with the current assessment done in the Emergency Department.  I have conducted an independent evaluation of this patient a history and physical is as follows:  The patient presents for evaluation of right lower abdominal pain that started suddenly and is currently menstruating  Patient has a history of kidney stones and also has a history of fibroids  Patient had a ultrasound of her pelvis about a week ago which was unremarkable other than the fibroids    No complaints of fever no urinary symptoms  No vomiting  Pain seems to be predominantly in the right lower abdomen right pelvic area not radiate to the back  Exam the patient is uncomfortable HEENT exam is unremarkable sclera anicteric mucous members are moist conjunctiva are pink lungs clear heart regular abdomen soft positive bowel sounds tenderness in the right lower quadrant and right pelvic area    CT scan reveals a ruptured ovarian cyst with some mild amount of free fluid  Urine pregnancy test is negative  Normal appendix  Fibroid uterus    Pain control  ED Course         Critical Care Time  Procedures

## 2025-01-08 ENCOUNTER — CONSULT (OUTPATIENT)
Dept: MULTI SPECIALTY CLINIC | Facility: CLINIC | Age: 35
End: 2025-01-08

## 2025-01-08 VITALS — TEMPERATURE: 97.2 F | HEIGHT: 62 IN | WEIGHT: 267 LBS | BODY MASS INDEX: 49.13 KG/M2

## 2025-01-08 DIAGNOSIS — L60.8 LONGITUDINAL MELANONYCHIA: Primary | ICD-10-CM

## 2025-01-08 RX ORDER — AMITRIPTYLINE HYDROCHLORIDE 10 MG/1
10 TABLET ORAL
COMMUNITY
Start: 2024-12-18 | End: 2025-12-18

## 2025-01-08 RX ORDER — FAMOTIDINE 20 MG/1
TABLET, FILM COATED ORAL
COMMUNITY
Start: 2024-10-03

## 2025-01-08 RX ORDER — EPINEPHRINE 0.3 MG/.3ML
0.3 INJECTION SUBCUTANEOUS
COMMUNITY

## 2025-01-08 RX ORDER — FERROUS SULFATE 325(65) MG
TABLET ORAL
COMMUNITY
Start: 2025-01-08

## 2025-01-08 RX ORDER — DIPHENOXYLATE HYDROCHLORIDE AND ATROPINE SULFATE 2.5; .025 MG/1; MG/1
1 TABLET ORAL DAILY
COMMUNITY

## 2025-01-08 RX ORDER — ASCORBIC ACID 500 MG
TABLET ORAL
COMMUNITY
Start: 2025-01-08

## 2025-01-08 NOTE — PROGRESS NOTES
"[facility] Note     Patient Name: Sharri Coon  Encounter Date: 1/8/25     Have you been cared for by a [facility] Dermatologist in the last 3 years and, if so, which description applies to you?    NO.   I am considered a \"new\" patient and must complete all patient intake questions. I am FEMALE/of child-bearing potential.    REVIEW OF SYSTEMS:  Have you recently had or currently have any of the following? Recent fever or chills? No  Any non-healing wound? No  Are you pregnant or planning to become pregnant? No  Are you currently or planning to be nursing or breast feeding? No   PAST MEDICAL HISTORY:  Have you personally ever had or currently have any of the following?  If \"YES,\" then please provide more detail. Skin cancer (such as Melanoma, Basal Cell Carcinoma, Squamous Cell Carcinoma?  No  Tuberculosis, HIV/AIDS, Hepatitis B or C: No  Radiation Treatment No   HISTORY OF IMMUNOSUPPRESSION:   Do you have a history of any of the following:  Systemic Immunosuppression such as Diabetes, Biologic or Immunotherapy, Chemotherapy, Organ Transplantation, Bone Marrow Transplantation or Prednsione?  No    Answering \"YES\" requires the addition of the dotphrase \"IMMUNOSUPPRESSED\" as the first diagnosis of the patient's visit.   FAMILY HISTORY:  Any \"first degree relatives\" (parent, brother, sister, or child) with the following?    Skin Cancer, Pancreatic or Other Cancer? No   PATIENT EXPERIENCE:    Do you want the Dermatologist to perform a COMPLETE skin exam today including a clinical examination under the \"bra and underwear\" areas?  NO  If necessary, do we have your permission to call and leave a detailed message on your Preferred Phone number that includes your specific medical information?  Yes      Allergies   Allergen Reactions    Latex     Onion - Food Allergy       Current Outpatient Medications:     amitriptyline (ELAVIL) 10 mg tablet, Take 10 mg by mouth, Disp: , Rfl:     ascorbic acid (VITAMIN " C) 500 MG tablet, , Disp: , Rfl:     famotidine (PEPCID) 20 mg tablet, TAKE 1 TABLET BY MOUTH TWICE A DAY AS NEEDED FOR HEARTBURN, Disp: , Rfl:     ferrous sulfate 325 (65 Fe) mg tablet, , Disp: , Rfl:     EPINEPHrine (EPIPEN) 0.3 mg/0.3 mL SOAJ, Inject 0.3 mg into a muscle, Disp: , Rfl:     Loratadine (CLARITIN) 10 MG CAPS, Take 1 tablet by mouth daily. (Patient not taking: Reported on 1/8/2025), Disp: , Rfl:     methocarbamol (ROBAXIN) 500 mg tablet, Take 1 tablet (500 mg total) by mouth 4 (four) times a day as needed for muscle spasms. (Patient not taking: Reported on 1/8/2025), Disp: 20 tablet, Rfl: 0    multivitamin (THERAGRAN) TABS, Take 1 tablet by mouth daily, Disp: , Rfl:     oxyCODONE-acetaminophen (PERCOCET) 5-325 mg per tablet, Take 1 tablet by mouth every 6 (six) hours as needed for moderate pain for up to 12 doses Max Daily Amount: 12 tablets, Disp: 12 tablet, Rfl: 0          Whom besides the patient is providing clinical information about today's encounter?   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 No other nails affected. Not growing or changing.     Assessment and Plan:  Based on a thorough discussion of this condition and the management approach to it (including a comprehensive discussion of the known risks, side effects and potential benefits of treatment), the patient (family) agrees to implement the following specific plan:  Pictures taken in office  Monitor, return in one year. Call office if any growth, changes, or skin involvement      What is melanonychia?    Melanonychia is characterized by brown to black discoloration of a finger or  toe nail. Longitudinal melanonychia describes a pigmented band that extends from the nailbed to the tip of the nail and is due to melanin within the nail plate.   Longitudinal melanonychia is most often benign and arises from a a mole or a lentigo (a freckle). However, a band of brown pigment in a single nail must be examined and investigated with caution, as melanonychia may be the presenting sign of melanoma.      Melanonychia occurs more commonly in dark-skinned individuals with some studies suggesting that nearly all Afro-Caribbeans will develop black-brown pigmentation of the nails by the age of 50. It may also be present in up to 20% of Occitan patients. White-skinned people are less commonly affected.  Melanonychia can present in individuals of all ages, including children, and affects males and females equally.     What causes melanonychia?    Pigmentation results from the deposition of melanin by the pigment cells, or melanocytes. These typically lie dormant in the nail matrix where the nail originates. As the melanin is continuously deposited in the cells of the growing nail, a longitudinal streak arises. This is termed longitudinal melanonychia.    This deposition of melanin can result from 2 broad processes  Melanocytic activation, which is an increase in the production and deposition of melanin into the nail cells without an increase in the number of melanocytes. This can occur due to racial variation, friction, inflammatory skin diseases, skin cancers, phototherapy, and chemotherapy.   Melanocytic hyperplasia, which is an increase in the number of melanocytes (pigment cells) within the nail matrix where the nail grows out from. This can either be a benign or malignant process. Common causes are moles, freckles, and melanomas.   Some bacterial and fungal infections that stimulate inflammation can also cause melanonychia.   What are the symptoms of melanonychia?  Melanonychia typically manifests as a  single brown-black pigmented streak that may affect a single or multiple nails.   Longitudinal melanonychia extends from the nail fold (cuticle) to the free edge of the nail.  Complete melanonychia involves the entire nail plate and is due to pigment in the proximal nail matrix. Partial longitudinal melanonychia can be due to a tumor under the nail plate.  Transverse melanonychia runs across the nail. Transverse melanonychia is rare and is often associated with medically induced causes such as irradiation and medications.  Grey coloration often indicates melanocytic activation, whereas a brown band is due to melanocytic hyperplasia.  The pigment band may have a regular parallel pattern (benign) or an irregular pattern with loss of parallelism and irregular dots (potential melanoma).   Nail matrix melanoma affects both finger and toenails and most frequent in index, thumb, and the large toe.    How do we diagnose melanonychia?    You doctor will inspect all 20 nails, nail folds and mucosal membranes to detect associated signs that may suggest the underlying cause. A scraping may be performed to check for pathogen-induced pigmentation. Ascorbic acid may be applied to remove potassium permanganate staining.     An [employer] study concluded that acquired longitudinal melanonychias in adults should give rise to suspicion of  nail unit melanoma if any of the following are present.  Involvement of more than two-thirds of the nail plate   Grey or black in addition to brown color  Irregular brown pigmentation  Granular pigmentation  And/or nail dystrophy.  Additional factors of concern include:  New pigment, especially if there is no preceding trauma or other explanation  Recurrent spontaneous bleeding in the same site  Variation in color and thickness of the pigment band.   Definitive exclusion of melanoma of the nail unit is obtained with a nail matrix biopsy.     How do we treat  melanonychia?    Where melanonychia is attributed to a benign cause, no further treatment is necessary. The management of melanoma of the nail unit requires surgical removal of the tumor.     Melanonychia tends to persist, except when it is related to medication - in which case it fades following withdrawal of the medication.